# Patient Record
Sex: MALE
[De-identification: names, ages, dates, MRNs, and addresses within clinical notes are randomized per-mention and may not be internally consistent; named-entity substitution may affect disease eponyms.]

---

## 2022-05-03 ENCOUNTER — APPOINTMENT (OUTPATIENT)
Dept: ORTHOPEDIC SURGERY | Facility: CLINIC | Age: 82
End: 2022-05-03
Payer: SELF-PAY

## 2022-05-03 VITALS — BODY MASS INDEX: 30.61 KG/M2 | WEIGHT: 195 LBS | HEIGHT: 67 IN

## 2022-05-03 DIAGNOSIS — M65.9 SYNOVITIS AND TENOSYNOVITIS, UNSPECIFIED: ICD-10-CM

## 2022-05-03 DIAGNOSIS — Z78.9 OTHER SPECIFIED HEALTH STATUS: ICD-10-CM

## 2022-05-03 DIAGNOSIS — M70.41 PREPATELLAR BURSITIS, RIGHT KNEE: ICD-10-CM

## 2022-05-03 DIAGNOSIS — M25.569 PAIN IN UNSPECIFIED KNEE: ICD-10-CM

## 2022-05-03 DIAGNOSIS — M17.11 UNILATERAL PRIMARY OSTEOARTHRITIS, RIGHT KNEE: ICD-10-CM

## 2022-05-03 PROBLEM — Z00.00 ENCOUNTER FOR PREVENTIVE HEALTH EXAMINATION: Status: ACTIVE | Noted: 2022-05-03

## 2022-05-03 PROCEDURE — 73564 X-RAY EXAM KNEE 4 OR MORE: CPT | Mod: RT

## 2022-05-03 PROCEDURE — 99205 OFFICE O/P NEW HI 60 MIN: CPT

## 2022-05-03 NOTE — DISCUSSION/SUMMARY
[Medication Risks Reviewed] : Medication risks reviewed [Surgical risks reviewed] : Surgical risks reviewed [de-identified] : end stage arthritis\par We had an extensive discussion regarding surgical intervention including risk, benefits and alternatives.  The risks include, but are not limited to infection, bleeding, injury to small nerves and blood vessels, pain, stiffness, phlebitis, DVT and need for secondary procedures.  Preoperative, intraoperative and postoperative care were discussed and outlined to the patient as well.  I have also talked to the patient about the specific risks of computer assisted surgery and robotic surgery including the reported risk of malalignment, increased injury to neurovascular structures; ligamentous structure with minimally invasive computer assisted surgery as well as iatrogenic injury from the pin sites, risks of fracture around the pins or computer error.  In my opinion the benefits outweigh the risk.  The patient would like to proceed with this.\par he will think about surgery , gave him literature and also discussed risks of injections, higher risk of fatal events at his age but still relatively low

## 2022-05-03 NOTE — HISTORY OF PRESENT ILLNESS
[Gradual] : gradual [4] : 4 [2] : 2 [Dull/Aching] : dull/aching [Sharp] : sharp [Rest] : rest [FreeTextEntry1] : right knee  [FreeTextEntry9] : advil

## 2022-05-03 NOTE — REASON FOR VISIT
[FreeTextEntry2] : pt is  here for the right knee. pt has pain in the right knee. pt feels discomfort in the right knee.  the pain in the right knee  has been going  on for  a while now. pt had a cortisone  injection over  year ago  which only lasted for  3 weeks.

## 2022-05-03 NOTE — PHYSICAL EXAM
[5___] : hamstring 5[unfilled]/5 [10mm] : opening: 10mm [] : antalgic [Right] : right knee [advanced tricompartmental OA with medial compartment narrowing and varus alignment] : advanced tricompartmental OA with medial compartment narrowing and varus alignment [TWNoteComboBox7] : flexion 100 degrees [de-identified] : extension 15 degrees